# Patient Record
Sex: FEMALE | Race: WHITE | NOT HISPANIC OR LATINO | URBAN - METROPOLITAN AREA
[De-identification: names, ages, dates, MRNs, and addresses within clinical notes are randomized per-mention and may not be internally consistent; named-entity substitution may affect disease eponyms.]

---

## 2022-11-30 ENCOUNTER — HOSPITAL ENCOUNTER (OUTPATIENT)
Dept: RADIOLOGY | Facility: CLINIC | Age: 22
Discharge: HOME | End: 2022-11-30
Attending: ORTHOPAEDIC SURGERY
Payer: COMMERCIAL

## 2022-11-30 ENCOUNTER — OFFICE VISIT (OUTPATIENT)
Dept: ORTHOPEDICS | Facility: CLINIC | Age: 22
End: 2022-11-30
Payer: COMMERCIAL

## 2022-11-30 VITALS
HEART RATE: 96 BPM | SYSTOLIC BLOOD PRESSURE: 111 MMHG | TEMPERATURE: 98 F | BODY MASS INDEX: 20.46 KG/M2 | DIASTOLIC BLOOD PRESSURE: 61 MMHG | WEIGHT: 135 LBS | HEIGHT: 68 IN

## 2022-11-30 DIAGNOSIS — S83.272A COMPLEX TEAR OF LATERAL MENISCUS OF LEFT KNEE AS CURRENT INJURY, INITIAL ENCOUNTER: Primary | ICD-10-CM

## 2022-11-30 DIAGNOSIS — M25.562 ACUTE PAIN OF LEFT KNEE: ICD-10-CM

## 2022-11-30 PROCEDURE — 73562 X-RAY EXAM OF KNEE 3: CPT | Mod: LT,,, | Performed by: ORTHOPAEDIC SURGERY

## 2022-11-30 PROCEDURE — 3008F BODY MASS INDEX DOCD: CPT | Mod: CPTII,,, | Performed by: ORTHOPAEDIC SURGERY

## 2022-11-30 PROCEDURE — 3008F PR BODY MASS INDEX (BMI) DOCUMENTED: ICD-10-PCS | Mod: CPTII,,, | Performed by: ORTHOPAEDIC SURGERY

## 2022-11-30 PROCEDURE — 1159F MED LIST DOCD IN RCRD: CPT | Mod: CPTII,,, | Performed by: ORTHOPAEDIC SURGERY

## 2022-11-30 PROCEDURE — 99203 PR OFFICE/OUTPT VISIT, NEW, LEVL III, 30-44 MIN: ICD-10-PCS | Mod: ,,, | Performed by: ORTHOPAEDIC SURGERY

## 2022-11-30 PROCEDURE — 3078F PR MOST RECENT DIASTOLIC BLOOD PRESSURE < 80 MM HG: ICD-10-PCS | Mod: CPTII,,, | Performed by: ORTHOPAEDIC SURGERY

## 2022-11-30 PROCEDURE — 99203 OFFICE O/P NEW LOW 30 MIN: CPT | Mod: ,,, | Performed by: ORTHOPAEDIC SURGERY

## 2022-11-30 PROCEDURE — 3078F DIAST BP <80 MM HG: CPT | Mod: CPTII,,, | Performed by: ORTHOPAEDIC SURGERY

## 2022-11-30 PROCEDURE — 3074F PR MOST RECENT SYSTOLIC BLOOD PRESSURE < 130 MM HG: ICD-10-PCS | Mod: CPTII,,, | Performed by: ORTHOPAEDIC SURGERY

## 2022-11-30 PROCEDURE — 73562 XR KNEE 3 VIEW LEFT: ICD-10-PCS | Mod: LT,,, | Performed by: ORTHOPAEDIC SURGERY

## 2022-11-30 PROCEDURE — 1159F PR MEDICATION LIST DOCUMENTED IN MEDICAL RECORD: ICD-10-PCS | Mod: CPTII,,, | Performed by: ORTHOPAEDIC SURGERY

## 2022-11-30 PROCEDURE — 3074F SYST BP LT 130 MM HG: CPT | Mod: CPTII,,, | Performed by: ORTHOPAEDIC SURGERY

## 2022-11-30 RX ORDER — LEVOTHYROXINE SODIUM 75 UG/1
75 TABLET ORAL
COMMUNITY
Start: 2022-10-21

## 2022-11-30 NOTE — PROGRESS NOTES
Chief Complaint:   Chief Complaint   Patient presents with    Left Knee - Pain    Knee Pain     pain in left knee started around 6 months ago but really noticed it the last 3 months, pain is located on lateral side of knee, has been taking medicine every once in awhile and ice and has worn a compression sleeve sometimes       Consulting Physician: No ref. provider found    History of present illness:    she is a pleasant 22 y.o. year old female who started having pain in the left knee in May of 2022.  She thinks she may have injured it while working out.  She felt a pop in the knee.  She is had intermittent swelling and increasing in pain since August of 2022.  The pain is located laterally.  She is used an anti-inflammatory medicines intermittently.  She is using a brace.  She is had continued on her home exercise program over the last 6 months.  Unfortunately her pain has persisted.    History reviewed. No pertinent past medical history.    History reviewed. No pertinent surgical history.    Current Outpatient Medications   Medication Sig    levothyroxine (SYNTHROID) 75 MCG tablet Take 75 mcg by mouth.     No current facility-administered medications for this visit.       Review of patient's allergies indicates:  No Known Allergies    Family History   Problem Relation Age of Onset    Hypertension Mother     Hypertension Father        Social History     Socioeconomic History    Marital status: Single   Tobacco Use    Smoking status: Never    Smokeless tobacco: Never   Substance and Sexual Activity    Alcohol use: Yes    Drug use: Never    Sexual activity: Yes     Partners: Male       Review of Systems:    Constitution:   Denies chills, fever, and sweats.  HENT:   Denies headaches or blurry vision.  Cardiovascular:  Denies chest pain or irregular heart beat.  Respiratory:   Denies cough or shortness of breath.  Gastrointestinal:  Denies abdominal pain, nausea, or vomiting.  Musculoskeletal:   Denies muscle  "cramps.  Neurological:   Denies dizziness or focal weakness.  Psychiatric/Behavior: Normal mental status.  Hematology/Lymph:  Denies bleeding problem or easy bruising/bleeding.  Skin:    Denies rash or suspicious lesions.    Examination:    Vital Signs:    Vitals:    11/30/22 0858 11/30/22 0859   BP: 111/61    Pulse: 96    Temp: 97.6 °F (36.4 °C)    Weight: 61.2 kg (135 lb)    Height: 5' 8" (1.727 m)    PainSc:    8       Body mass index is 20.53 kg/m².    Constitution:   Well-developed, well nourished patient in no acute distress.  Neurological:   Alert and oriented x 3 and cooperative to examination.     Psychiatric/Behavior: Normal mental status.  Respiratory:   No shortness of breath.  Eyes:    Extraoccular muscles intact  Skin:    No scars, rash or suspicious lesions.    MSK:   Standing exam  stance: normal alignment, no significant leg-length discrepancy  gait: antalgic    Knee examination  - General comments: unremarkable appearance    - Tenderness:lateral jointline    Knee                  RIGHT    LEFT  Skin:                  Intact      Intact  ROM:                 0-130      0-130  Effusion:             Neg        +  MJL TTP:           Neg         Neg  LJL TTP:            Neg         +  Juan:         Neg         +  Pat crep:            Neg         Neg  Patella TTPs:     Neg         Neg  Patella grind:      Neg        Neg  Lachman:           Neg        Neg  Pivot shift:          Neg        Neg  Valgus stress:    Neg        Neg  Varus stress:      Neg        Neg  Posterior drawer: Neg       Neg    N-V intact intact  Hip: nml nml    Lower extremity edema:Negative     Imaging: X-rays ordered and images interpreted today personally by me of views left knee show normal bony alignment.       Assessment: Complex tear of lateral meniscus of left knee as current injury, initial encounter  -     X-Ray Knee 3 View Left; Future; Expected date: 11/30/2022        Plan:  Concern for lateral meniscus tear.  Will get " MRI to evaluate.  She has a ski trip planned to [place] December 13.